# Patient Record
Sex: FEMALE | Race: WHITE | NOT HISPANIC OR LATINO | URBAN - METROPOLITAN AREA
[De-identification: names, ages, dates, MRNs, and addresses within clinical notes are randomized per-mention and may not be internally consistent; named-entity substitution may affect disease eponyms.]

---

## 2021-10-19 DIAGNOSIS — O30.002 TWIN GESTATION IN SECOND TRIMESTER, UNSPECIFIED MULTIPLE GESTATION TYPE: Primary | ICD-10-CM

## 2021-10-19 RX ORDER — ASPIRIN 81 MG/1
81 TABLET ORAL DAILY
COMMUNITY

## 2021-10-19 RX ORDER — OMEPRAZOLE 20 MG/1
20 CAPSULE, DELAYED RELEASE ORAL DAILY
COMMUNITY

## 2021-10-25 ENCOUNTER — OFFICE VISIT (OUTPATIENT)
Dept: MATERNAL FETAL MEDICINE | Facility: CLINIC | Age: 26
End: 2021-10-25
Payer: COMMERCIAL

## 2021-10-25 VITALS
OXYGEN SATURATION: 100 % | WEIGHT: 278 LBS | HEIGHT: 64 IN | DIASTOLIC BLOOD PRESSURE: 70 MMHG | HEART RATE: 117 BPM | RESPIRATION RATE: 20 BRPM | BODY MASS INDEX: 47.46 KG/M2 | SYSTOLIC BLOOD PRESSURE: 128 MMHG

## 2021-10-25 DIAGNOSIS — O30.002 TWIN GESTATION IN SECOND TRIMESTER, UNSPECIFIED MULTIPLE GESTATION TYPE: ICD-10-CM

## 2021-10-25 PROCEDURE — 76811 PR US, OB FETAL EVAL & EXAM, TRANSABDOM,FIRST GESTATION: ICD-10-PCS | Mod: S$GLB,,, | Performed by: OBSTETRICS & GYNECOLOGY

## 2021-10-25 PROCEDURE — 76812 OB US DETAILED ADDL FETUS: ICD-10-PCS | Mod: S$GLB,,, | Performed by: OBSTETRICS & GYNECOLOGY

## 2021-10-25 PROCEDURE — 76812 OB US DETAILED ADDL FETUS: CPT | Mod: S$GLB,,, | Performed by: OBSTETRICS & GYNECOLOGY

## 2021-10-25 PROCEDURE — 76811 OB US DETAILED SNGL FETUS: CPT | Mod: S$GLB,,, | Performed by: OBSTETRICS & GYNECOLOGY

## 2021-10-25 PROCEDURE — 99203 PR OFFICE/OUTPT VISIT, NEW, LEVL III, 30-44 MIN: ICD-10-PCS | Mod: TH,25,S$GLB, | Performed by: OBSTETRICS & GYNECOLOGY

## 2021-10-25 PROCEDURE — 99203 OFFICE O/P NEW LOW 30 MIN: CPT | Mod: TH,25,S$GLB, | Performed by: OBSTETRICS & GYNECOLOGY

## 2021-11-16 DIAGNOSIS — O30.002 TWIN GESTATION IN SECOND TRIMESTER, UNSPECIFIED MULTIPLE GESTATION TYPE: Primary | ICD-10-CM

## 2021-11-16 DIAGNOSIS — O30.042 DICHORIONIC DIAMNIOTIC TWIN PREGNANCY IN SECOND TRIMESTER: ICD-10-CM

## 2021-11-22 ENCOUNTER — PROCEDURE VISIT (OUTPATIENT)
Dept: MATERNAL FETAL MEDICINE | Facility: CLINIC | Age: 26
End: 2021-11-22
Payer: COMMERCIAL

## 2021-11-22 VITALS
RESPIRATION RATE: 20 BRPM | BODY MASS INDEX: 48.23 KG/M2 | DIASTOLIC BLOOD PRESSURE: 72 MMHG | WEIGHT: 281 LBS | HEART RATE: 97 BPM | SYSTOLIC BLOOD PRESSURE: 120 MMHG | OXYGEN SATURATION: 98 %

## 2021-11-22 DIAGNOSIS — O30.042 DICHORIONIC DIAMNIOTIC TWIN PREGNANCY IN SECOND TRIMESTER: ICD-10-CM

## 2021-11-22 PROCEDURE — 99213 PR OFFICE/OUTPT VISIT, EST, LEVL III, 20-29 MIN: ICD-10-PCS | Mod: 25,S$GLB,, | Performed by: OBSTETRICS & GYNECOLOGY

## 2021-11-22 PROCEDURE — 76816 PR  US,PREGNANT UTERUS,F/U,TRANSABD APP: ICD-10-PCS | Mod: 59,S$GLB,, | Performed by: OBSTETRICS & GYNECOLOGY

## 2021-11-22 PROCEDURE — 99213 OFFICE O/P EST LOW 20 MIN: CPT | Mod: 25,S$GLB,, | Performed by: OBSTETRICS & GYNECOLOGY

## 2021-11-22 PROCEDURE — 76816 OB US FOLLOW-UP PER FETUS: CPT | Mod: S$GLB,,, | Performed by: OBSTETRICS & GYNECOLOGY

## 2021-11-22 RX ORDER — FERROUS SULFATE 325(65) MG
TABLET ORAL
COMMUNITY

## 2021-11-22 RX ORDER — ONDANSETRON 4 MG/1
TABLET, ORALLY DISINTEGRATING ORAL
COMMUNITY
Start: 2021-07-07

## 2021-11-22 RX ORDER — FOLIC ACID 1 MG/1
TABLET ORAL
COMMUNITY
Start: 2021-11-04

## 2021-11-22 RX ORDER — ASPIRIN 325 MG
TABLET, DELAYED RELEASE (ENTERIC COATED) ORAL
COMMUNITY

## 2021-12-23 ENCOUNTER — TELEPHONE (OUTPATIENT)
Dept: MATERNAL FETAL MEDICINE | Facility: CLINIC | Age: 26
End: 2021-12-23
Payer: COMMERCIAL

## 2021-12-23 DIAGNOSIS — O30.043 DICHORIONIC DIAMNIOTIC TWIN PREGNANCY IN THIRD TRIMESTER: ICD-10-CM

## 2021-12-23 DIAGNOSIS — O30.042 DICHORIONIC DIAMNIOTIC TWIN PREGNANCY IN SECOND TRIMESTER: Primary | ICD-10-CM

## 2022-01-06 ENCOUNTER — PROCEDURE VISIT (OUTPATIENT)
Dept: MATERNAL FETAL MEDICINE | Facility: CLINIC | Age: 27
End: 2022-01-06
Payer: COMMERCIAL

## 2022-01-06 VITALS
BODY MASS INDEX: 48.75 KG/M2 | HEART RATE: 118 BPM | RESPIRATION RATE: 20 BRPM | WEIGHT: 284 LBS | SYSTOLIC BLOOD PRESSURE: 136 MMHG | DIASTOLIC BLOOD PRESSURE: 78 MMHG | OXYGEN SATURATION: 98 %

## 2022-01-06 DIAGNOSIS — O24.415 GESTATIONAL DIABETES MELLITUS (GDM) IN THIRD TRIMESTER CONTROLLED ON ORAL HYPOGLYCEMIC DRUG: Primary | ICD-10-CM

## 2022-01-06 DIAGNOSIS — O30.043 DICHORIONIC DIAMNIOTIC TWIN PREGNANCY IN THIRD TRIMESTER: ICD-10-CM

## 2022-01-06 PROCEDURE — 99213 OFFICE O/P EST LOW 20 MIN: CPT | Mod: 25,S$GLB,, | Performed by: OBSTETRICS & GYNECOLOGY

## 2022-01-06 PROCEDURE — 76819 PR US, OB, FETAL BIOPHYSICAL, W/O NST: ICD-10-PCS | Mod: 59,S$GLB,, | Performed by: OBSTETRICS & GYNECOLOGY

## 2022-01-06 PROCEDURE — 76819 FETAL BIOPHYS PROFIL W/O NST: CPT | Mod: S$GLB,,, | Performed by: OBSTETRICS & GYNECOLOGY

## 2022-01-06 PROCEDURE — 76816 PR  US,PREGNANT UTERUS,F/U,TRANSABD APP: ICD-10-PCS | Mod: S$GLB,,, | Performed by: OBSTETRICS & GYNECOLOGY

## 2022-01-06 PROCEDURE — 99213 PR OFFICE/OUTPT VISIT, EST, LEVL III, 20-29 MIN: ICD-10-PCS | Mod: 25,S$GLB,, | Performed by: OBSTETRICS & GYNECOLOGY

## 2022-01-06 PROCEDURE — 76816 OB US FOLLOW-UP PER FETUS: CPT | Mod: S$GLB,,, | Performed by: OBSTETRICS & GYNECOLOGY

## 2022-01-06 RX ORDER — GLYBURIDE 1.25 MG/1
5 TABLET ORAL
COMMUNITY

## 2022-01-06 NOTE — LETTER
January 6, 2022        Jenni Duncan MD  [address]  [facility] [address]             [facility]  [address]  [place] [address]  Phone: [phone]  Fax: [phone]   Patient: Geo Carreon   MR Number: [id number]   YOB: 1995   Date of Visit: 1/6/2022       Dear Dr. Duncan:    Thank you for referring Geo Carreon to me for evaluation. Below are the relevant portions of my assessment and plan of care.            If you have questions, please do not hesitate to call me. I look forward to following Geo along with you.    Sincerely,      Amaury Gold Jr., MD        CC  No Recipients

## 2022-01-06 NOTE — PROGRESS NOTES
Geo is here for followup [facility] consultation for Di/Di twin gestation and new diagnosis (12/3/21) of gestational diabetes, referred by Dr. Duncan.     She is feeling fetal movement.    Geo denies vaginal bleeding, loss of fluid, recurrent contractions.    She did bring her glucose log and is taking Glyburide 1.25 mg PO QAM, as well as ASA 81 mg PO daily.    Of note, she did have Covid and received the antibody infusion on 12/23/21.    Vitals:    01/06/22 1259   BP: 136/78   Pulse: (!) 118   Resp: 20   SpO2: 98%   Weight: 128.8 kg (284 lb)     BMI:                    48.75 kg/m^2

## 2022-01-06 NOTE — PROGRESS NOTES
Indication for follow up consultation:  1. Intrauterine pregnancy at 29w5d  2. Di-Di Twins    Provider requesting consultation: Jenni Duncan MD    Dear Dr. Duncan,    I had the pleasure of seeing Geo Carreon for follow up consultation and sonography today.  Thank you again for allowing us to assist in her care.  As you recall she is a 26 y.o.  at 29w5d.  As you know we are following her for a set of dichorionic diamniotic twins.  She had COVID and received an antibiotic infusion on .  She is feeling well.      She also has a new diagnosis of gestational diabetes and is currently on glyburide 1.25 mg in the morning.  She brought her sugars in today and her post prandials look great and really above 120.  Half of her fastings are normal and other half is slightly elevated and rarely above 100.    Review of systems: The patient denies any vaginal bleeding, loss of fluid or contraction pain today.  Vitals:    22 1259   BP: 136/78   Pulse: (!) 118   Resp: 20   SpO2: 98%   Weight: 128.8 kg (284 lb)     Body mass index is 48.75 kg/m².      Physical exam:  Gen: WDWN in NAD  HEENT: WNL  Abdomen: Soft, non-tender, non-distended  Skin: No rash or jaundice  Extremities: Symmetric in size, no clubbing, cyanosis, or edema  Neuro: Grossly intact    Ultrasound:  A repeat detailed fetal anatomical survey was completed once again today.  We again demonstrated a set of dichorionic diamniotic twins.  Twin a is cephalic and twin B is breech.  Biophysical profile is 8/8 for both.  Twin A measures 30w0d which is at the 51st percentile or 3 lb 10 oz.  No structural malformations were seen.  Twin B is at the 65th percentile for 31 weeks and 0 days or 4 lb 3 oz.  It is abdominal circumference is accelerated at 33 weeks which is above the 90th percentile.  The other fetus is abdominal circumference was around the 50th percentile.  I do not see anything pathologic regarding actual anatomy.  Please SEE ATTACHED REPORT  for full details.      Assessment:  1. Intrauterine pregnancy at 29w5d  2. Di-Di Twins  3. GDM controlled on glyburide    Recommendations:   1. I am glad to report that what we can see from an anatomic standpoint looks good for both babies.  They are both measuring ahead of schedule, baby B is measuring ahead because the abdominal circumference is large.  I do not see anything pathologic regarding the anatomy that is causing this it just appears to be accelerated.  Overall her sugars look decent on the glyburide.  Her post prandials are normal while she has occasional fasting hyperglycemia.  Please be aware that were using much less glyburide for now because studies have shown that even with reported good sugar control on glyburide as a high proportion of these babies being born large for gestational age.  I am not saying that she needed to change to something now says she is on a very low dosage some most of her control is probably from appropriate dieting.  If however you noticed an upward trend in her sugars I would recommend changing her over to insulin  2. We will see her back here again in 4 weeks      We greatly appreciate you allowing us to assist in her care.  Please call with any questions or concerns.    Sincerely,    Amaury Gold Jr., M.D.  Maternal Fetal Medicine    Total time personally involved in this patient's care was 23 minutes

## 2022-01-31 DIAGNOSIS — O24.415 GESTATIONAL DIABETES MELLITUS (GDM) IN THIRD TRIMESTER CONTROLLED ON ORAL HYPOGLYCEMIC DRUG: Primary | ICD-10-CM

## 2022-02-10 DIAGNOSIS — O24.415 GESTATIONAL DIABETES MELLITUS (GDM) IN THIRD TRIMESTER CONTROLLED ON ORAL HYPOGLYCEMIC DRUG: Primary | ICD-10-CM

## 2022-02-10 DIAGNOSIS — O30.043 DICHORIONIC DIAMNIOTIC TWIN PREGNANCY IN THIRD TRIMESTER: ICD-10-CM

## 2022-02-17 ENCOUNTER — PROCEDURE VISIT (OUTPATIENT)
Dept: MATERNAL FETAL MEDICINE | Facility: CLINIC | Age: 27
End: 2022-02-17
Payer: COMMERCIAL

## 2022-02-17 VITALS
HEART RATE: 99 BPM | BODY MASS INDEX: 51.67 KG/M2 | WEIGHT: 293 LBS | SYSTOLIC BLOOD PRESSURE: 126 MMHG | DIASTOLIC BLOOD PRESSURE: 78 MMHG | OXYGEN SATURATION: 98 % | RESPIRATION RATE: 20 BRPM

## 2022-02-17 DIAGNOSIS — O30.043 DICHORIONIC DIAMNIOTIC TWIN PREGNANCY IN THIRD TRIMESTER: Primary | ICD-10-CM

## 2022-02-17 DIAGNOSIS — O24.415 GESTATIONAL DIABETES MELLITUS (GDM) IN THIRD TRIMESTER CONTROLLED ON ORAL HYPOGLYCEMIC DRUG: ICD-10-CM

## 2022-02-17 PROCEDURE — 99213 PR OFFICE/OUTPT VISIT, EST, LEVL III, 20-29 MIN: ICD-10-PCS | Mod: 25,S$GLB,, | Performed by: OBSTETRICS & GYNECOLOGY

## 2022-02-17 PROCEDURE — 76816 OB US FOLLOW-UP PER FETUS: CPT | Mod: S$GLB,,, | Performed by: OBSTETRICS & GYNECOLOGY

## 2022-02-17 PROCEDURE — 99213 OFFICE O/P EST LOW 20 MIN: CPT | Mod: 25,S$GLB,, | Performed by: OBSTETRICS & GYNECOLOGY

## 2022-02-17 PROCEDURE — 76819 FETAL BIOPHYS PROFIL W/O NST: CPT | Mod: S$GLB,,, | Performed by: OBSTETRICS & GYNECOLOGY

## 2022-02-17 PROCEDURE — 76819 PR US, OB, FETAL BIOPHYSICAL, W/O NST: ICD-10-PCS | Mod: S$GLB,,, | Performed by: OBSTETRICS & GYNECOLOGY

## 2022-02-17 PROCEDURE — 76816 PR  US,PREGNANT UTERUS,F/U,TRANSABD APP: ICD-10-PCS | Mod: 59,S$GLB,, | Performed by: OBSTETRICS & GYNECOLOGY

## 2022-02-17 RX ORDER — NIFEDIPINE 20 MG/1
CAPSULE ORAL
COMMUNITY
Start: 2022-02-08

## 2022-02-17 RX ORDER — LABETALOL 300 MG/1
300 TABLET, FILM COATED ORAL 2 TIMES DAILY
COMMUNITY
Start: 2022-02-08

## 2022-02-17 NOTE — PROGRESS NOTES
Geo is here for followup [facility] consultation for gestational diabetes in pregnancy, referred by Dr. Duncan; new dx: gestational hypertension, taking Labetalol 300 mg PO BID and low dose ASA daily.  She was inpt on  -  for  contractions and now takes Nifedipine 20 mg PO Q8H.    She is feeling fetal movement.    Geo denies vaginal bleeding, loss of fluid, recurrent contractions, headaches, visual, or epigastric complaints.    She did not bring her glucose log today.  She is taking medications for control, Glyburide 1.25 mg PO QAM.      Vitals:    22 1106   BP: 126/78   Pulse: 99   Resp: 20   SpO2: 98%   Weight: (!) 136.5 kg (301 lb)     BMI:                    51.67 kg/m^2

## 2022-02-17 NOTE — LETTER
February 17, 2022        Jenni Duncan MD  [address]  [facility] [address]             [facility]  [address]  [place] [address]  Phone: [phone]  Fax: [phone]   Patient: Geo Carreon   MR Number: [id number]   YOB: 1995   Date of Visit: 2/17/2022       Dear Dr. Duncan:    Thank you for referring Geo Carreon to me for evaluation. Below are the relevant portions of my assessment and plan of care.            If you have questions, please do not hesitate to call me. I look forward to following Geo along with you.    Sincerely,      Amaury Gold Jr., MD        CC  No Recipients

## 2022-02-17 NOTE — PROGRESS NOTES
Indication for follow up consultation:  1. Intrauterine pregnancy at 35w6d  2. Di-Di Twins  3. GDM on Glyburide    Provider requesting consultation: Jenni Duncan MD    Dear Dr. Duncan,    I had the pleasure of seeing Geo Carreon for follow up consultation and sonography today.  Thank you again for allowing us to assist in her care.  As you recall she is a 26 y.o.  at 35w6d.  Patient is surprisingly been feeling well considering that she is near term with twins.  She did not bring her sugars in today but states that they have been really good, the highest postprandial she has had is 115. She is on glyburide 1.25 mg in the morning.  She did have an episode of pre term labor and was admitted for that.  She received corticosteroids and was sent home on nifedipine 20 mg TID.  She reports tolerating this medication.  She is also on labetalol 300 mg twice daily for gestational hypertension.  Her last hemoglobin A1c was 5.7.  She had coagulation studies done during her  labor admission and her fibrinogen was 571. We are asked to look at that and it is normal.    Review of systems: The patient denies any vaginal bleeding, loss of fluid or contraction pain today.  Vitals:    22 1106   BP: 126/78   Pulse: 99   Resp: 20   SpO2: 98%   Weight: (!) 136.5 kg (301 lb)     Body mass index is 51.67 kg/m².      Physical exam:  Gen: WDWN in NAD  HEENT: WNL  Abdomen: Soft, non-tender, non-distended  Skin: No rash or jaundice, slight peau d'orange of dependent abdomen  Extremities: Symmetric in size, no clubbing, cyanosis; 2+ LE edema  Neuro: Grossly intact    Ultrasound:  A repeat detailed fetal anatomical survey was completed once again today.  Both babies are cephalic today with twin A on the maternal left side.  This baby's biophysical profile is 6/8 with 2 off for fluid.   It measures at the 41st percentile at 35 weeks and 0 days or 2840 g or 6 lb 4 oz.  details of the anatomy cannot be obtained at this  late juncture.  Placenta is posterior and grade 1.  Heartbeat 140 beats per minute.  Twin B is cephalic on the maternal right side.  It has generous amniotic fluid and biophysical profile is 8/8.  This baby is measuring large today with abdominal circumference near 41 weeks.  The baby is at the 94th percentile or 3807 g or 8 lb 6 oz.  No obvious structure malformations were seen.  Maximum vertical pocket 6.4 cm.  Please SEE ATTACHED REPORT for full details.      Assessment:  1. Intrauterine pregnancy at 35w6d  2. Tomy Twins  3. Twin A with oligohydramnios  4. Twin B with macrosomia  5. Gestational diabetes    Recommendations:   1. Dr. Duncan I contacted you by phone following my visit with your patient.  The amniotic fluid volume around baby A subjectively appears very low.  A number of  subcentimeter pockets can be seen so there is fluid present but it meets criteria for significant oligohydramnios.  At this juncture, particularly since she has received corticosteroids are ready, I would proceed with delivery.  The remainder of the biophysical profile was reassuring so I feel delivery is acceptable with you in [place] and you are agreeable to that also.  The patient understands that twins near 36 weeks typically do very well but there is always still a small chance of the babies needing to be transported for higher level/NICU care  2. Twin B was quite large today at 8 lb 4 oz.  I suspect that we are over estimating it but feel it will still be large.  Her gestational diabetes has been apparently well controlled but be sure that the pediatricians check this baby's glucose immediately after delivery and then whatever protocol they typically follow for evaluating these babies glucose levels in patients with gestational diabetes to be sure it does not begin to have significant hypoglycemia      We greatly appreciate you allowing us to assist in her care.  Please call with any questions or  concerns.    Sincerely,    Amaury Gold Jr., M.D.  Maternal Fetal Medicine    Total time personally involved in this patient's care was 35 minutes